# Patient Record
Sex: FEMALE | Race: WHITE | NOT HISPANIC OR LATINO | Employment: UNEMPLOYED | URBAN - METROPOLITAN AREA
[De-identification: names, ages, dates, MRNs, and addresses within clinical notes are randomized per-mention and may not be internally consistent; named-entity substitution may affect disease eponyms.]

---

## 2021-12-19 ENCOUNTER — HISTORICAL (OUTPATIENT)
Dept: ADMINISTRATIVE | Facility: HOSPITAL | Age: 51
End: 2021-12-19

## 2021-12-19 LAB — SARS-COV-2 RNA RESP QL NAA+PROBE: NOT DETECTED

## 2022-04-09 ENCOUNTER — HISTORICAL (OUTPATIENT)
Dept: ADMINISTRATIVE | Facility: HOSPITAL | Age: 52
End: 2022-04-09

## 2022-04-25 VITALS
OXYGEN SATURATION: 96 % | SYSTOLIC BLOOD PRESSURE: 126 MMHG | WEIGHT: 112.44 LBS | BODY MASS INDEX: 19.2 KG/M2 | DIASTOLIC BLOOD PRESSURE: 88 MMHG | HEIGHT: 64 IN

## 2022-12-27 ENCOUNTER — HOSPITAL ENCOUNTER (OUTPATIENT)
Dept: RADIOLOGY | Facility: HOSPITAL | Age: 52
Discharge: HOME | End: 2022-12-27
Attending: ANESTHESIOLOGY
Payer: COMMERCIAL

## 2022-12-27 DIAGNOSIS — M54.16 LUMBAR RADICULOPATHY: Primary | ICD-10-CM

## 2022-12-27 DIAGNOSIS — M54.16 LUMBAR RADICULOPATHY: ICD-10-CM

## 2022-12-27 PROCEDURE — 72100 X-RAY EXAM L-S SPINE 2/3 VWS: CPT | Mod: TC

## 2023-10-05 ENCOUNTER — HOSPITAL ENCOUNTER (EMERGENCY)
Facility: HOSPITAL | Age: 53
Discharge: HOME | End: 2023-10-05
Attending: STUDENT IN AN ORGANIZED HEALTH CARE EDUCATION/TRAINING PROGRAM
Payer: COMMERCIAL

## 2023-10-05 VITALS
OXYGEN SATURATION: 97 % | DIASTOLIC BLOOD PRESSURE: 96 MMHG | HEART RATE: 83 BPM | RESPIRATION RATE: 18 BRPM | BODY MASS INDEX: 31.24 KG/M2 | WEIGHT: 183 LBS | HEIGHT: 64 IN | TEMPERATURE: 99 F | SYSTOLIC BLOOD PRESSURE: 146 MMHG

## 2023-10-05 DIAGNOSIS — S20.211A CONTUSION OF RIGHT FRONT WALL OF THORAX, INITIAL ENCOUNTER: Primary | ICD-10-CM
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
## 2023-10-05 PROCEDURE — 99283 EMERGENCY DEPT VISIT LOW MDM: CPT | Mod: 25

## 2023-10-05 RX ORDER — DICLOFENAC SODIUM 10 MG/G
2 GEL TOPICAL 4 TIMES DAILY
Qty: 20 G | Refills: 0 | Status: SHIPPED | OUTPATIENT
Start: 2023-10-05

## 2023-10-05 NOTE — ED PROVIDER NOTES
Encounter Date: 10/5/2023       History     Chief Complaint   Patient presents with    Fall     Reports she fell about a week ago, landed on her right shoulder since then has been having pain to her right upper chest wall.     53-year-old female with a history of scoliosis presents with a complaint of right upper chest wall pain following a fall.  Onset x1 week ago.  Provocative factors include movement and deep breathing.  No palliative factors reported.  Patient states she fell 1 week ago mainly impacting her right chest wall upon falling and she has been having discomfort ever since.  She denies hitting her head or loss of consciousness.  She denies head pain, blurry vision, dizziness or weakness, nausea and vomiting, dyspnea.    The history is provided by the patient and the spouse. No  was used.     Review of patient's allergies indicates:  No Known Allergies  Past Medical History:   Diagnosis Date    Back pain     Depression      Past Surgical History:   Procedure Laterality Date    CHOLECYSTECTOMY      NECK SURGERY       No family history on file.  Social History     Tobacco Use    Smoking status: Never    Smokeless tobacco: Never     Review of Systems   Constitutional: Negative.  Negative for appetite change, chills and fever.   HENT:  Negative for congestion and sore throat.    Eyes: Negative.    Respiratory:  Negative for cough, chest tightness and shortness of breath.    Cardiovascular: Negative.  Negative for chest pain.   Gastrointestinal: Negative.  Negative for abdominal pain, diarrhea and nausea.   Endocrine: Negative.    Genitourinary: Negative.  Negative for dysuria.   Musculoskeletal: Negative.  Negative for back pain.        Right chest wall pain   Skin: Negative.  Negative for rash.   Allergic/Immunologic: Negative.    Neurological: Negative.  Negative for weakness.   Hematological: Negative.  Does not bruise/bleed easily.   Psychiatric/Behavioral: Negative.         Physical  Exam     Initial Vitals [10/05/23 1027]   BP Pulse Resp Temp SpO2   (!) 146/96 83 18 98.6 °F (37 °C) 97 %      MAP       --         Physical Exam    Nursing note and vitals reviewed.  Constitutional: She appears well-developed and well-nourished.   HENT:   Head: Normocephalic and atraumatic.   Eyes: Conjunctivae and EOM are normal. Pupils are equal, round, and reactive to light.   Neck: Neck supple.   Normal range of motion.  Cardiovascular:  Normal rate, regular rhythm, normal heart sounds and intact distal pulses.           Pulmonary/Chest: Breath sounds normal.   Abdominal: Abdomen is soft. Bowel sounds are normal.   Musculoskeletal:         General: Normal range of motion.        Arms:       Cervical back: Normal range of motion and neck supple.     Neurological: She is alert and oriented to person, place, and time. She has normal strength.   Skin: Skin is warm and dry. Capillary refill takes less than 2 seconds.   Psychiatric: She has a normal mood and affect. Her behavior is normal.         ED Course   Procedures  Labs Reviewed - No data to display       Imaging Results              X-Ray Chest PA And Lateral (Final result)  Result time 10/05/23 11:28:34      Final result by Red Jamil MD (10/05/23 11:28:34)                   Impression:      No acute cardiopulmonary process identified.      Electronically signed by: Red Jamil  Date:    10/05/2023  Time:    11:28               Narrative:    EXAMINATION:  XR CHEST PA AND LATERAL    CLINICAL HISTORY:  Unspecified fall, initial encounter    TECHNIQUE:  Two-view    COMPARISON:  None available.    FINDINGS:  Cardiopericardial silhouette is within normal limits. Lungs are without dense focal or segmental consolidation, congestion, pleural effusion or pneumothorax.    There is suspected hiatal hernia.                                       X-Ray Ribs 2 View Right (Final result)  Result time 10/05/23 11:31:18      Final result by Red Jamil MD (10/05/23  11:31:18)                   Impression:      No rib fracture identified.      Electronically signed by: Red Jamil  Date:    10/05/2023  Time:    11:31               Narrative:    EXAMINATION:  XR RIBS 2 VIEW RIGHT    CLINICAL HISTORY:  Unspecified fall, initial encounter    TECHNIQUE:  Three-view    COMPARISON:  Chest radiograph same date    FINDINGS:  No fracture deformity of the right ribs identified.  Right lung is well expanded and clear.  No pneumothorax.  Right upper quadrant of the abdomen metallic clips.                                       Medications - No data to display  Medical Decision Making  Differential diagnoses:  Chest contusion, right rib fracture, pneumothorax, muscle strain of right chest wall    53-year-old female with a history of scoliosis presents with a complaint of right upper chest wall pain following a fall.  Onset x1 week ago.  Provocative factors include movement and deep breathing.  No palliative factors reported.  Patient states she fell 1 week ago mainly impacting her right chest wall upon falling and she has been having discomfort ever since.  She denies hitting her head or loss of consciousness.  She denies head pain, blurry vision, dizziness or weakness, nausea and vomiting, dyspnea.  Physical exam as documented.  Patient does have bruising to her right chest wall and right breast and her right chest wall is tender to touch.  Bilateral breath sounds are clear to auscultation and she denies dyspnea.  She is being evaluated with an x-ray to rule out right rib fractures.  X-rays are negative.  Patient will be discharged home with diclofenac gel.  She currently takes methadone and baclofen for her chronic back pain/scoliosis and I have instructed her to continue taking her medications as prescribed.  She may follow up with her primary care provider if her symptoms persist.  She may return to the ED for worsening.  Patient verbalized understanding of the plan and agrees.    Amount  and/or Complexity of Data Reviewed  Radiology:  Decision-making details documented in ED Course.                               Clinical Impression:   Final diagnoses:  [W19.XXXA] Fall  [S20.211A] Contusion of right front wall of thorax, initial encounter (Primary)        ED Disposition Condition    Discharge Stable          ED Prescriptions       Medication Sig Dispense Start Date End Date Auth. Provider    diclofenac sodium (VOLTAREN) 1 % Gel Apply 2 g topically 4 (four) times daily. 20 g 10/5/2023 -- Martita Robledo NP          Follow-up Information       Follow up With Specialties Details Why Contact Info    Graciela Young MD Family Medicine In 1 week For ED follow-up [address] [facility]  [address]  [facility] [address]  [phone]               Martita Robledo NP  10/05/23 9606

## 2024-01-13 ENCOUNTER — HOSPITAL ENCOUNTER (EMERGENCY)
Facility: HOSPITAL | Age: 54
Discharge: HOME | End: 2024-01-13
Attending: SPECIALIST
Payer: COMMERCIAL

## 2024-01-13 VITALS
SYSTOLIC BLOOD PRESSURE: 134 MMHG | OXYGEN SATURATION: 98 % | HEIGHT: 64 IN | HEART RATE: 101 BPM | BODY MASS INDEX: 30.73 KG/M2 | WEIGHT: 180 LBS | DIASTOLIC BLOOD PRESSURE: 75 MMHG | TEMPERATURE: 97 F | RESPIRATION RATE: 18 BRPM

## 2024-01-13 DIAGNOSIS — R51.9 NONINTRACTABLE HEADACHE, UNSPECIFIED CHRONICITY PATTERN, UNSPECIFIED HEADACHE TYPE: Primary | ICD-10-CM

## 2024-01-13 PROCEDURE — 25000003 PHARM REV CODE 250: Performed by: SPECIALIST

## 2024-01-13 PROCEDURE — 99284 EMERGENCY DEPT VISIT MOD MDM: CPT | Mod: 25

## 2024-01-13 PROCEDURE — 63600175 PHARM REV CODE 636 W HCPCS: Performed by: SPECIALIST

## 2024-01-13 PROCEDURE — 96372 THER/PROPH/DIAG INJ SC/IM: CPT | Mod: 59 | Performed by: SPECIALIST

## 2024-01-13 PROCEDURE — 96374 THER/PROPH/DIAG INJ IV PUSH: CPT

## 2024-01-13 RX ORDER — BUTALBITAL, ACETAMINOPHEN AND CAFFEINE 50; 325; 40 MG/1; MG/1; MG/1
1 TABLET ORAL EVERY 4 HOURS PRN
Qty: 15 TABLET | Refills: 0 | Status: SHIPPED | OUTPATIENT
Start: 2024-01-13 | End: 2024-02-12

## 2024-01-13 RX ORDER — BUTALBITAL, ACETAMINOPHEN AND CAFFEINE 50; 325; 40 MG/1; MG/1; MG/1
1 TABLET ORAL
Status: COMPLETED | OUTPATIENT
Start: 2024-01-13 | End: 2024-01-13

## 2024-01-13 RX ORDER — PROCHLORPERAZINE EDISYLATE 5 MG/ML
10 INJECTION INTRAMUSCULAR; INTRAVENOUS
Status: COMPLETED | OUTPATIENT
Start: 2024-01-13 | End: 2024-01-13

## 2024-01-13 RX ORDER — SUMATRIPTAN 6 MG/.5ML
6 INJECTION, SOLUTION SUBCUTANEOUS
Status: COMPLETED | OUTPATIENT
Start: 2024-01-13 | End: 2024-01-13

## 2024-01-13 RX ADMIN — BUTALBITAL, ACETAMINOPHEN, AND CAFFEINE 1 TABLET: 325; 50; 40 TABLET ORAL at 10:01

## 2024-01-13 RX ADMIN — PROCHLORPERAZINE EDISYLATE 10 MG: 5 INJECTION INTRAMUSCULAR; INTRAVENOUS at 10:01

## 2024-01-13 RX ADMIN — SUMATRIPTAN 6 MG: 6 INJECTION SUBCUTANEOUS at 09:01

## 2024-01-13 RX ADMIN — SODIUM CHLORIDE 500 ML: 9 INJECTION, SOLUTION INTRAVENOUS at 10:01

## 2024-01-14 NOTE — ED PROVIDER NOTES
Encounter Date: 1/13/2024       History     Chief Complaint   Patient presents with    Migraine     Complaints of migraine headache for the last 3 days. Was seen at urgent care today and was given steroid and ketorolac injections. States it did not help and is worse tonight. Denies weakness of vision changes. Has history of migraines     Patient presents with a headache she has had for 3 days that she says is a migraine; she reports the headache is similar to previous migraine she had in the past; no focal deficits, no visual changes, no thunderclap; she reports nausea and photosensitivity; she was seen at an after hours clinic earlier today and had Toradol and a steroid without relief; she takes methadone on a daily basis for chronic back and neck pain    The history is provided by the patient.     Review of patient's allergies indicates:  No Known Allergies  Past Medical History:   Diagnosis Date    Back pain     Depression      Past Surgical History:   Procedure Laterality Date    CHOLECYSTECTOMY      NECK SURGERY       No family history on file.  Social History     Tobacco Use    Smoking status: Never    Smokeless tobacco: Never     Review of Systems   Constitutional: Negative.    HENT: Negative.     Respiratory: Negative.     Cardiovascular: Negative.    Gastrointestinal: Negative.    Musculoskeletal:  Positive for arthralgias, back pain and neck pain.   Skin: Negative.    Neurological: Negative.    Psychiatric/Behavioral:  Positive for dysphoric mood.    All other systems reviewed and are negative.      Physical Exam     Initial Vitals [01/13/24 2053]   BP Pulse Resp Temp SpO2   134/75 101 18 97.4 °F (36.3 °C) 98 %      MAP       --         Physical Exam    Nursing note and vitals reviewed.  Constitutional: She appears well-developed and well-nourished.   HENT:   Head: Normocephalic and atraumatic.   Eyes: EOM are normal. Pupils are equal, round, and reactive to light.   Pupils bilaterally equal, dilated at 4  mm   Neck: Neck supple.   Normal range of motion.  Cardiovascular:  Normal rate, regular rhythm, normal heart sounds and intact distal pulses.           Pulmonary/Chest: Breath sounds normal.   Abdominal: Abdomen is soft.   Musculoskeletal:         General: Normal range of motion.      Cervical back: Normal range of motion and neck supple.     Neurological: She is alert and oriented to person, place, and time. She has normal strength. No cranial nerve deficit. GCS score is 15. GCS eye subscore is 4. GCS verbal subscore is 5. GCS motor subscore is 6.   Skin: Skin is warm and dry.         ED Course   Procedures  Labs Reviewed - No data to display       Imaging Results    None          Medications   SUMAtriptan succinate injection 6 mg (6 mg Subcutaneous Given 1/13/24 2118)   sodium chloride 0.9% bolus 500 mL 500 mL (500 mLs Intravenous New Bag 1/13/24 2221)   prochlorperazine injection Soln 10 mg (10 mg Intravenous Given 1/13/24 2223)   butalbital-acetaminophen-caffeine -40 mg per tablet 1 tablet (1 tablet Oral Given 1/13/24 2209)     Medical Decision Making  Patient presents with a headache she has had for 3 days that she says is a migraine; she reports the headache is similar to previous migraine she had in the past; no focal deficits, no visual changes, no thunderclap; she reports nausea and photosensitivity; she was seen at an after hours clinic earlier today and had Toradol and a steroid without relief; she takes methadone on a daily basis for chronic back and neck pain    DIFFERENTIAL DIAGNOSIS- migraine headache, tension headache, mixed headache    Risk  Prescription drug management.  Risk Details: Patient received Imitrex but after an hour this did not improve her headache; an IV was started she was given 500 mL of normal saline along with Compazine 10 mg IV in the IV fluids and Fioricet; this relieved her headache; a prescription for Fioricet was sent to her pharmacy and she was encouraged to follow up  "with her primary care physician within a week; she was hemodynamically stable                     Patient Vitals for the past 24 hrs:   BP Temp Pulse Resp SpO2 Height Weight   01/13/24 2053 134/75 97.4 °F (36.3 °C) 101 18 98 % 5' 4" (1.626 m) 81.6 kg (180 lb)        The patient is resting comfortably and in no acute distress.  She states that her symptoms have improved after treatment in Emergency Department. I personally discussed her test results and treatment plan.  Gave strict ED precautions.  Specific conditions for return to the emergency department and importance of follow up with her primary care provided or the physician listed on the discharge instructions.  Patient voices understanding and agrees to the plan discussed. All patients' questions have been answered at this time.   She has remained hemodynamically stable throughout entire stay in ED and is stable for discharge home.              Clinical Impression:  Final diagnoses:  [R51.9] Nonintractable headache, unspecified chronicity pattern, unspecified headache type (Primary)          ED Disposition Condition    Discharge Stable          ED Prescriptions       Medication Sig Dispense Start Date End Date Auth. Provider    butalbital-acetaminophen-caffeine -40 mg (FIORICET, ESGIC) -40 mg per tablet Take 1 tablet by mouth every 4 (four) hours as needed for Pain. 15 tablet 1/13/2024 2/12/2024 Refugio Jordan MD          Follow-up Information       Follow up With Specialties Details Why Contact Info    Graciela Young MD Family Medicine In 1 week  [address] [facility]  [address]  [facility] [address]  [phone]               Refugio Jordan MD  01/13/24 2333    "